# Patient Record
Sex: MALE | Employment: OTHER
[De-identification: names, ages, dates, MRNs, and addresses within clinical notes are randomized per-mention and may not be internally consistent; named-entity substitution may affect disease eponyms.]

---

## 2024-11-15 ENCOUNTER — TRANSCRIBE ORDERS (OUTPATIENT)
Dept: OTHER | Age: 79
End: 2024-11-15

## 2024-11-15 DIAGNOSIS — R32 URINARY INCONTINENCE, UNSPECIFIED TYPE: Primary | ICD-10-CM

## 2024-11-20 ENCOUNTER — THERAPY VISIT (OUTPATIENT)
Dept: PHYSICAL THERAPY | Facility: CLINIC | Age: 79
End: 2024-11-20
Payer: COMMERCIAL

## 2024-11-20 DIAGNOSIS — N81.89 PELVIC FLOOR WEAKNESS: ICD-10-CM

## 2024-11-20 DIAGNOSIS — R32 URINARY INCONTINENCE, UNSPECIFIED TYPE: Primary | ICD-10-CM

## 2024-11-20 PROCEDURE — 97110 THERAPEUTIC EXERCISES: CPT | Mod: GP

## 2024-11-20 PROCEDURE — 97530 THERAPEUTIC ACTIVITIES: CPT | Mod: GP

## 2024-11-20 PROCEDURE — 97161 PT EVAL LOW COMPLEX 20 MIN: CPT | Mod: GP

## 2024-11-20 NOTE — PROGRESS NOTES
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 UI Did improve some after his surgery but has persisted ever since. Leakage feels constant (both at rest and w/ activity). Endorses voiding frequently during the day to limit UI. Used to be an avid cyclist, currently can cycle up to 30 min but is limited by his leakage. He cannot feel when he leaks but is aware of when he needs to urinate. Denies urgency or UUI. Has never had pelvic floor PT before.   Would like to be sexually active but is limited by leaking urine. Does have erectile dysfunction since his RALP. Has been able to get semi-erect rarely.  He has tried medications and injections, but hasn't used them in years d/t discomfort from these erections.   Denies hip, back, pelvic pain. Hx of knee surgery (L meniscus and R scope to remove bone spurs) and these can cause some discomfort.  In a typical week he enjoys walking, cycling, exercise classes, lifting.    LTGs: stop using pads, get an erection        Presenting condition or subjective complaint:    Date of onset: 11/15/24 (PT order date)    Relevant medical history:     Dates & types of surgery:      Prior diagnostic imaging/testing results: (Patient-Rptd) Other (Patient-Rptd) none   Prior therapy history for the same diagnosis, illness or injury: (Patient-Rptd) No      Prior Level of Function  Transfers:   Ambulation:   ADL:   IADL:     Living Environment  Social support: (Patient-Rptd) With a significant other or spouse   Type of home: (Patient-Rptd) Apartment/condo   Stairs to enter the home:         Ramp: (Patient-Rptd) No   Stairs inside the home: (Patient-Rptd) No        Help at home: (Patient-Rptd) None  Equipment owned:       Employment: (Patient-Rptd) Not Applicable    Hobbies/Interests: (Patient-Rptd) Documenting Photography    Patient goals for therapy:      Pain assessment: Pain denied     Objective      PELVIC EVALUATION  ADDITIONAL HISTORY:  Sex assigned at birth: (Patient-Rptd) Male  Gender identity: (Patient-Rptd) Male    Pronouns: (Patient-Rptd) He/Him/His      Bladder History:  Feels bladder filling: (Patient-Rptd) No  Triggers for feeling of inability to wait to go to the bathroom: (Patient-Rptd) Yes (Patient-Rptd) running water  How long can you wait to urinate: (Patient-Rptd) I leak often  Gets up at night to urinate: (Patient-Rptd) Yes (Patient-Rptd) 2  Can stop the flow of urine when urinating: (Patient-Rptd) No  Volume of urine usually released: (Patient-Rptd) Medium   Other issues: (Patient-Rptd) Slow or hesitant urine stream  Number of bladder infections in last 12 months:    Fluid intake per day: (Patient-Rptd) 50 Onces      Medications taken for bladder: (Patient-Rptd) No     Activities causing urine leak: (Patient-Rptd) Other activities (Patient-Rptd) all the time  Amount of urine typically leaked:    Pads used to help with leaking: (Patient-Rptd) Yes I use this many pads per day: (Patient-Rptd) 4        Bowel History:  Frequency of bowel movement: (Patient-Rptd) 2 times  Consistency of stool: (Patient-Rptd) Soft-formed    Ignores the urge to defecate: (Patient-Rptd) No  Other bowel issues:    Length of time spent trying to have a bowel movement:      Sexual Function History:  Sexual orientation: (Patient-Rptd) Straight    Sexually active:    Lubrication used:      Pelvic pain:      Pain or difficulty with orgasms/erection/ejaculation:      State of menopause:    Hormone medications:        Do you get regular exercise: (Patient-Rptd) Yes  Have you tried pelvic floor strengthening exercises for 4 weeks: (Patient-Rptd) No  Do you have any history of trauma  that is relevant to your care that you d like to share: (Patient-Rptd) No      Discussed reason for referral regarding pelvic health needs and external/internal pelvic floor muscle examination with patient/guardian.  Opportunity provided to ask questions and verbal consent for assessment and intervention was given.    PAIN:   POSTURE: WNL  LUMBAR SCREEN:   (Degrees) Left AROM Left PROM  Right AROM Right PROM   Hip Flexion  100  100   Hip Extension  0  0   Hip Abduction       Hip Adduction       Hip Internal Rotation  5  5   Hip External Rotation  WNL  WNL   Knee Flexion  WNL  WNL   Knee Extension  WNL  WNL   Lumbar Side bend Mod loss Mod loss   Lumbar Flexion Fingers mid-shin   Lumbar Extension Min loss   Pain:   End feel: stiff endfeel w/ all hip PROM  HIP SCREEN:  Strength:   Pain: - none + mild ++ moderate +++ severe  Strength Scale: 0-5/5 Left Right   Hip Flexion 4 4   Hip Extension 4 4   Hip Abduction     Hip Adduction     Hip Internal Rotation 4+ 4+   Hip External Rotation 5 5   Knee Flexion 5 5   Knee Extension        Functional Strength Testing: Double Leg Squat: Anterior knee translation and Knee valgus  Single Leg Squat: Anterior knee translation, Knee valgus, Hip internal rotation, and Improper use of glutes/hips  SLS: 5 seconds PANCHO, increased trunk lean and difficulty on L    PELVIC/SI SCREEN:     PAIN PROVOCATION TEST:   PELVIS/SI SPECIAL TESTS:   BREATHING SYMMETRY:     PELVIC EXAM  External Visual Inspection:  At rest: Normal  With voluntary pelvic floor contraction: Perineal elevation, Present  Relaxation of PFM: Yes  With intra-abdominal pressure: Cough: Perineal elevation    Integumentary:   Anal: WNL  Penile: WNL    External Digital Palpation per Perineum:   Levator ani: Unremarkable  Perineal body: Unremarkable    Internal Digital Palpation:  Per Vagina:      Per Rectum:        Pelvic Organ Prolapse:       ABDOMINAL ASSESSMENT  Diastasis Rectus Abdominis (MANDO):  MANDO presence: Yes  Location    Above Umbilicus Depth/Finger width: 2 cm w/ doming    Abdominal Activation/Strength:  lower abdominals 1B    Scar:   Location/Type: laparoscopy incisions  Mobility: WNL    Fascial Tension/Restriction: WNL    BIOFEEDBACK:  Position: Supine, Sit  Surface Electrodes: Perineal    Abdominals: Synergistic with pelvic floor    Perianals:   Baseline muscle activity: 2-3 microvolts  Peak Amplitude/MVC: 15 microvolts  Duration of Sustained Contraction: 10 seconds  Endurance Hold-Average mean amplitude/work average: 7 microvolts  Subjective Assessment: Time to return to baseline muscle activity: Slow de-recruitment  Requires cues to correct glute/quad compensations and breath-holding    Assessment & Plan   CLINICAL IMPRESSIONS  Medical Diagnosis: Urinary incontinence, unspecified type    Treatment Diagnosis: Urinary incontinence   Impression/Assessment: Patient is a 78 year old male with pelvic floor complaints.  The following significant findings have been identified: Decreased ROM/flexibility, Decreased joint mobility, Decreased strength, Impaired muscle performance, and Decreased activity tolerance. These impairments interfere with their ability to perform self care tasks, recreational activities, household chores, driving , household mobility, and community mobility as compared to previous level of function.     Clinical Decision Making (Complexity):  Clinical Presentation: Stable/Uncomplicated  Clinical Presentation Rationale: based on medical and personal factors listed in PT evaluation  Clinical Decision Making (Complexity): Low complexity    PLAN OF CARE  Treatment Interventions:  Modalities: Biofeedback, Ultrasound  Interventions: Manual Therapy, Neuromuscular Re-education, Therapeutic Activity, Therapeutic Exercise, Self-Care/Home Management    Long Term Goals     PT Goal 1  Goal Identifier: UI  Goal Description: Pt will report needing no more than one pad a day for UI  Rationale: to maximize safety and independence  with performance of ADLs and functional tasks;to maximize safety and independence within the home;to maximize safety and independence within the community;to maximize safety and independence with transportation;to maximize safety and independence with self cares (for skin hygiene and reduce risk of infection/breakdown)  Goal Progress: 4 pads a day  Target Date: 02/17/25      Frequency of Treatment: every-other wk  Duration of Treatment: 3 mo    Recommended Referrals to Other Professionals:   Education Assessment:   Learner/Method: Patient;Listening;Demonstration;Pictures/Video;No Barriers to Learning    Risks and benefits of evaluation/treatment have been explained.   Patient/Family/caregiver agrees with Plan of Care.     Evaluation Time:     PT Eval, Low Complexity Minutes (74420): 30       Signing Clinician: MOON JEWELL, CHER        [facility]                                                                                   OUTPATIENT PHYSICAL THERAPY      PLAN OF TREATMENT FOR OUTPATIENT REHABILITATION   Patient's Last Name, First Name, Teo Carmona    YOB: 1945   Provider's Name   [facility]   Medical Record No.  [id number]     Onset Date: 11/15/24 (PT order date)  Start of Care Date: 11/20/24     Medical Diagnosis:  Urinary incontinence, unspecified type      PT Treatment Diagnosis:  Urinary incontinence Plan of Treatment  Frequency/Duration: every-other wk/ 3 mo    Certification date from 11/20/24 to 02/17/25         See note for plan of treatment details and functional goals     MOON JEWELL, PT                         I CERTIFY THE NEED FOR THESE SERVICES FURNISHED UNDER        THIS PLAN OF TREATMENT AND WHILE UNDER MY CARE .             Physician Signature               Date    X_____________________________________________________                  Referring Provider:  Provider Not In System    Initial Assessment  See  Epic Evaluation- Start of Care Date: 11/20/24

## 2024-11-27 ENCOUNTER — THERAPY VISIT (OUTPATIENT)
Dept: PHYSICAL THERAPY | Facility: CLINIC | Age: 79
End: 2024-11-27
Payer: COMMERCIAL

## 2024-11-27 DIAGNOSIS — N81.89 PELVIC FLOOR WEAKNESS: Primary | ICD-10-CM

## 2024-11-27 DIAGNOSIS — R32 URINARY INCONTINENCE, UNSPECIFIED TYPE: ICD-10-CM

## 2024-11-27 PROCEDURE — 97530 THERAPEUTIC ACTIVITIES: CPT | Mod: GP

## 2024-11-27 PROCEDURE — 97110 THERAPEUTIC EXERCISES: CPT | Mod: GP

## 2024-12-31 ENCOUNTER — TELEPHONE (OUTPATIENT)
Dept: PHYSICAL THERAPY | Facility: CLINIC | Age: 79
End: 2024-12-31